# Patient Record
Sex: FEMALE | URBAN - METROPOLITAN AREA
[De-identification: names, ages, dates, MRNs, and addresses within clinical notes are randomized per-mention and may not be internally consistent; named-entity substitution may affect disease eponyms.]

---

## 2019-03-05 ENCOUNTER — OFFICE (OUTPATIENT)
Dept: URBAN - METROPOLITAN AREA CLINIC 101 | Facility: CLINIC | Age: 39
End: 2019-03-05
Payer: MEDICAID

## 2019-03-05 VITALS
HEART RATE: 99 BPM | DIASTOLIC BLOOD PRESSURE: 74 MMHG | HEIGHT: 68 IN | TEMPERATURE: 98.1 F | WEIGHT: 293 LBS | SYSTOLIC BLOOD PRESSURE: 125 MMHG

## 2019-03-05 DIAGNOSIS — R19.7 DIARRHEA, UNSPECIFIED: ICD-10-CM

## 2019-03-05 DIAGNOSIS — E66.01 MORBID (SEVERE) OBESITY DUE TO EXCESS CALORIES: ICD-10-CM

## 2019-03-05 DIAGNOSIS — R10.84 GENERALIZED ABDOMINAL PAIN: ICD-10-CM

## 2019-03-05 PROCEDURE — 99203 OFFICE O/P NEW LOW 30 MIN: CPT

## 2019-03-05 RX ORDER — DICYCLOMINE HYDROCHLORIDE 20.6 MG/1
TABLET ORAL
Qty: 60 | Refills: 1 | Status: ACTIVE
Start: 2019-03-05

## 2019-03-05 NOTE — SERVICEHPINOTES
CHANELLE FORTE   is a   38   year old    female who is being seen in consultation at the request of   SEN MURDOCK   for abdominal pain. Generalized abdominal pain, worsens after eating. She states pain began awhile ago but was intermittent, has became daily 3-4 weeks ago. Imaging (CT with IV contrast, abd u/s) has been unremarkable. Mild leukocytosis (15.8) on labs 1/24/19. Pain was then thought to be due to a ovarian cyst but this was r/o as cause of pain. BRShe also reports intermittent diarrhea. BMs 1-2x/day, BSS type 4-7. Pain sometimes improves with BM but not always. Cramping and sharp pain, sometimes makes her double over. No fevers or chills. No weight loss, rectal bleeding. No family hx of colon cancer or IBD. She denies any recent antibiotics or travel. She does have well water. She started lyrica 3-4 days ago as she has chronic back pain due to herniated discs. Recently (6-7 months ago) diagnosed with diabetes, started on metformin around this time. She had her teeth pulled 5-6 years ago. Since she was dx with diabetes, she has changed her diet and is trying to eat more fruits/vegetables and less fast food. She smokes 2 ppd. She does have asthma, uses inhaler prn. No cardiac issues--she did have a failed intubation during a procedure in the past.